# Patient Record
Sex: MALE | Race: WHITE | NOT HISPANIC OR LATINO
[De-identification: names, ages, dates, MRNs, and addresses within clinical notes are randomized per-mention and may not be internally consistent; named-entity substitution may affect disease eponyms.]

---

## 2022-04-27 PROBLEM — Z00.00 ENCOUNTER FOR PREVENTIVE HEALTH EXAMINATION: Status: ACTIVE | Noted: 2022-04-27

## 2022-06-10 ENCOUNTER — APPOINTMENT (OUTPATIENT)
Dept: ORTHOPEDIC SURGERY | Facility: CLINIC | Age: 72
End: 2022-06-10
Payer: MEDICARE

## 2022-06-10 VITALS — WEIGHT: 218 LBS | BODY MASS INDEX: 31.21 KG/M2 | HEIGHT: 70 IN

## 2022-06-10 DIAGNOSIS — Z80.0 FAMILY HISTORY OF MALIGNANT NEOPLASM OF DIGESTIVE ORGANS: ICD-10-CM

## 2022-06-10 DIAGNOSIS — Z78.9 OTHER SPECIFIED HEALTH STATUS: ICD-10-CM

## 2022-06-10 DIAGNOSIS — Z86.79 PERSONAL HISTORY OF OTHER DISEASES OF THE CIRCULATORY SYSTEM: ICD-10-CM

## 2022-06-10 PROCEDURE — 99214 OFFICE O/P EST MOD 30 MIN: CPT | Mod: 25

## 2022-06-10 PROCEDURE — 20610 DRAIN/INJ JOINT/BURSA W/O US: CPT | Mod: 50

## 2022-06-10 NOTE — PROCEDURE
[Large Joint Injection] : Large joint injection [Bilateral] : bilaterally of the [Knee] : knee [Orthovisc] : Orthovisc [#1] : series #1

## 2022-06-13 NOTE — HISTORY OF PRESENT ILLNESS
[7] : 7 [4] : 4 [Dull/Aching] : dull/aching [Sharp] : sharp [de-identified] :  \par Reason for Visit 6. Injection \par Patient Complaint - 63 y/o male c/o left knee pain for past few months without injury. Pain with arising out of a chair. Can tolerate level walking. No buckling or locking.\par 5/20/15: Pt is here for L knee f/u. He is s/p Orthovisc series in November 2014. He will begin repeat series today with #1 L knee.\par 5/28/15: He is here for the second left knee Orthovisc injection. He reports no problems after the first injection.\par 11/4/15: Pt is here for Orthovisc injection #3 for left knee.\par 12/7/15: Pt is here for orthovisc injection # 4 left knee. He reports improvement in pain.\par \par 3/22/16: NI: right knee. he reports worsening right knee pain, he has obtained excellent relief in the past from orthovisc injections for the right knee and a request to start a new series of injections for the RIGHT knee will be made today.\par \par 4/6/16: Pt presents today to start a new series of Orthovisc injections for the right knee. \par \par 4/18/16: Pt presents for Orthovisc injection #2 for the right knee. He states that he has felt some slight improvement since the first injection.\par \par 6/15/16: Pt presents for Orthovisc injection #3 for the right knee. \par \par 7/15/16: Pt presents for final Orthovisc injections #4 for the right knee. \par \par 7/20/16: PT present for f/u back and left shoulder. \par He reports a recent exacerbation of lower back pain\par He reports left shoulder pain which is present when reaching upwards and causing nocturnal awakening - *SA injection left shoulder. \par \par He reports that right knee Orthovisc injections provided good relief. \par He will return next week to start a series of orthovisc injections for the left knee\par \par 9/20/16: Pt presents for f/u. He reports that he obtained excellent relief from a series of Orthovisc injections in the past and has requested to start a new series today. Pt presents to start a new series of Orthovisc injections for the left knee. \par \par _____\par \par 7/2/18: Mr. Ugo Diallo, a 68-year-old male, presents today for b/l knees. \par He reports that he has been experiencing sharp pain over the posterior aspect of the left knee. He reports that he has experiencing zhanna giving way of the left knee. \par He reports that he obtained significant relief from orthovisc injections two years ago and is interested in starting a new series. \par *New series of Orthovisc injections for b/l knees started today.\par \par 7/19/18: Pt presents for Orthovisc injections #2 for b/l knees.\par \par 8/2/18: Pt presents for Orthovisc injections #3 for b/l knees.\par \par 8/8/18: Pt presents for final Orthovisc injections #4 for b/l knees.\par \par 4/29/19: Pt presents for f/u b/l knees. He reports that he obtained significant relief after completing the series of Orthovisc injections in August. He reports that recently he has been seeing a return and worsening of his b/l knee pain. He states that his pain is most present with climbing stairs and extended walking. \par *New series Orthovisc injections for b/l knees started today.\par \par 7/1/19: Pt presents for Orthovisc injections #2 for b/l knees.\par \par 8/7/19: Pt presents for Orthovisc injections #3 for b/l knees.\par \par 8/29/19: Pt presents for Orthovisc injections #4 for b/l knees.\par \par 6/14/2021: PT presents today for F/UP B/L knee.\par Pt reports that B/L knee Orthovisc injections provided good relief from previous visit.\par **New series Orthovisc injections for b/l knees started today**.\par \par 6/21/2021: Pt presents for Orthovisc injections #2 for b/l knees.\par \par 6/28/2021:Pt presents for Orthovisc injections #3 for b/l knees.\par \par 7/6/2021:Pt presents for Orthovisc injections #4 for b/l knees \par  \par 06/10/2022: B/L knee\par Pt is interested in starting a new series. Pt reports longer than 6 months following the last Orthovisc series. \par *****Orthovisc B/L knee inj #1******* [] : Post Surgical Visit: no [FreeTextEntry1] : both knees  [de-identified] : none

## 2022-06-13 NOTE — PHYSICAL EXAM
[Bilateral] : knee bilaterally [FreeTextEntry3] : \par \par RT knee: moderate effusion. ROM slight flexion contracture to at least 120 degrees. No laxity large MFC ridge Najma's is positive producing posterior pain.\par \par LT Knee: Small effusion. ROM 0-120 degrees. No laxity. Moderate MFC osteophyte ridge. Najma's negative. No joint line tenderness weight bearing is painful. There is an antalgic gait due to pain from both knees.

## 2022-06-24 ENCOUNTER — APPOINTMENT (OUTPATIENT)
Dept: ORTHOPEDIC SURGERY | Facility: CLINIC | Age: 72
End: 2022-06-24
Payer: MEDICARE

## 2022-06-24 VITALS — HEIGHT: 70 IN | WEIGHT: 218 LBS | BODY MASS INDEX: 31.21 KG/M2

## 2022-06-24 PROCEDURE — 20610 DRAIN/INJ JOINT/BURSA W/O US: CPT | Mod: 50

## 2022-06-24 PROCEDURE — 99213 OFFICE O/P EST LOW 20 MIN: CPT | Mod: 25

## 2022-06-24 NOTE — PROCEDURE
[Large Joint Injection] : Large joint injection [Bilateral] : bilaterally of the [Knee] : knee [Orthovisc] : Orthovisc [#2] : series #2

## 2022-06-24 NOTE — HISTORY OF PRESENT ILLNESS
[7] : 7 [4] : 4 [Dull/Aching] : dull/aching [Sharp] : sharp [2] : 2 [Orthovisc] : Orthovisc [de-identified] :  \par Reason for Visit 6. Injection \par Patient Complaint - 63 y/o male c/o left knee pain for past few months without injury. Pain with arising out of a chair. Can tolerate level walking. No buckling or locking.\par 5/20/15: Pt is here for L knee f/u. He is s/p Orthovisc series in November 2014. He will begin repeat series today with #1 L knee.\par 5/28/15: He is here for the second left knee Orthovisc injection. He reports no problems after the first injection.\par 11/4/15: Pt is here for Orthovisc injection #3 for left knee.\par 12/7/15: Pt is here for orthovisc injection # 4 left knee. He reports improvement in pain.\par \par 3/22/16: NI: right knee. he reports worsening right knee pain, he has obtained excellent relief in the past from orthovisc injections for the right knee and a request to start a new series of injections for the RIGHT knee will be made today.\par \par 4/6/16: Pt presents today to start a new series of Orthovisc injections for the right knee. \par \par 4/18/16: Pt presents for Orthovisc injection #2 for the right knee. He states that he has felt some slight improvement since the first injection.\par \par 6/15/16: Pt presents for Orthovisc injection #3 for the right knee. \par \par 7/15/16: Pt presents for final Orthovisc injections #4 for the right knee. \par \par 7/20/16: PT present for f/u back and left shoulder. \par He reports a recent exacerbation of lower back pain\par He reports left shoulder pain which is present when reaching upwards and causing nocturnal awakening - *SA injection left shoulder. \par \par He reports that right knee Orthovisc injections provided good relief. \par He will return next week to start a series of orthovisc injections for the left knee\par \par 9/20/16: Pt presents for f/u. He reports that he obtained excellent relief from a series of Orthovisc injections in the past and has requested to start a new series today. Pt presents to start a new series of Orthovisc injections for the left knee. \par \par _____\par \par 7/2/18: Mr. Ugo Diallo, a 68-year-old male, presents today for b/l knees. \par He reports that he has been experiencing sharp pain over the posterior aspect of the left knee. He reports that he has experiencing zhanna giving way of the left knee. \par He reports that he obtained significant relief from orthovisc injections two years ago and is interested in starting a new series. \par *New series of Orthovisc injections for b/l knees started today.\par \par 7/19/18: Pt presents for Orthovisc injections #2 for b/l knees.\par \par 8/2/18: Pt presents for Orthovisc injections #3 for b/l knees.\par \par 8/8/18: Pt presents for final Orthovisc injections #4 for b/l knees.\par \par 4/29/19: Pt presents for f/u b/l knees. He reports that he obtained significant relief after completing the series of Orthovisc injections in August. He reports that recently he has been seeing a return and worsening of his b/l knee pain. He states that his pain is most present with climbing stairs and extended walking. \par *New series Orthovisc injections for b/l knees started today.\par \par 7/1/19: Pt presents for Orthovisc injections #2 for b/l knees.\par \par 8/7/19: Pt presents for Orthovisc injections #3 for b/l knees.\par \par 8/29/19: Pt presents for Orthovisc injections #4 for b/l knees.\par \par 6/14/2021: PT presents today for F/UP B/L knee.\par Pt reports that B/L knee Orthovisc injections provided good relief from previous visit.\par **New series Orthovisc injections for b/l knees started today**.\par \par 6/21/2021: Pt presents for Orthovisc injections #2 for b/l knees.\par \par 6/28/2021:Pt presents for Orthovisc injections #3 for b/l knees.\par \par 7/6/2021:Pt presents for Orthovisc injections #4 for b/l knees \par  \par 06/10/2022: B/L knee\par Pt is interested in starting a new series. Pt reports longer than 6 months following the last Orthovisc series. \par *****Orthovisc B/L knee inj #1*******\par \par 06/24/2022: B/L knee\par ******Orthovisc B/L inj #2************* [] : Post Surgical Visit: no [FreeTextEntry1] : both knees  [de-identified] : none  [de-identified] : 6/10/2022 [de-identified] : bilateral knee [TWNoteComboBox1] : 80%

## 2022-07-08 ENCOUNTER — APPOINTMENT (OUTPATIENT)
Dept: ORTHOPEDIC SURGERY | Facility: CLINIC | Age: 72
End: 2022-07-08

## 2022-07-08 VITALS — WEIGHT: 218 LBS | HEIGHT: 70 IN | BODY MASS INDEX: 31.21 KG/M2

## 2022-07-08 PROCEDURE — 20610 DRAIN/INJ JOINT/BURSA W/O US: CPT | Mod: 50

## 2022-07-08 PROCEDURE — 99213 OFFICE O/P EST LOW 20 MIN: CPT | Mod: 25

## 2022-07-08 NOTE — PROCEDURE
[Large Joint Injection] : Large joint injection [Bilateral] : bilaterally of the [Knee] : knee [Orthovisc] : Orthovisc [#3] : series #3

## 2022-07-08 NOTE — HISTORY OF PRESENT ILLNESS
[Orthovisc] : Orthovisc [7] : 7 [4] : 4 [Dull/Aching] : dull/aching [Sharp] : sharp [2] : 2 [de-identified] :  \par Reason for Visit 6. Injection \par Patient Complaint - 63 y/o male c/o left knee pain for past few months without injury. Pain with arising out of a chair. Can tolerate level walking. No buckling or locking.\par 5/20/15: Pt is here for L knee f/u. He is s/p Orthovisc series in November 2014. He will begin repeat series today with #1 L knee.\par 5/28/15: He is here for the second left knee Orthovisc injection. He reports no problems after the first injection.\par 11/4/15: Pt is here for Orthovisc injection #3 for left knee.\par 12/7/15: Pt is here for orthovisc injection # 4 left knee. He reports improvement in pain.\par \par 3/22/16: NI: right knee. he reports worsening right knee pain, he has obtained excellent relief in the past from orthovisc injections for the right knee and a request to start a new series of injections for the RIGHT knee will be made today.\par \par 4/6/16: Pt presents today to start a new series of Orthovisc injections for the right knee. \par \par 4/18/16: Pt presents for Orthovisc injection #2 for the right knee. He states that he has felt some slight improvement since the first injection.\par \par 6/15/16: Pt presents for Orthovisc injection #3 for the right knee. \par \par 7/15/16: Pt presents for final Orthovisc injections #4 for the right knee. \par \par 7/20/16: PT present for f/u back and left shoulder. \par He reports a recent exacerbation of lower back pain\par He reports left shoulder pain which is present when reaching upwards and causing nocturnal awakening - *SA injection left shoulder. \par \par He reports that right knee Orthovisc injections provided good relief. \par He will return next week to start a series of orthovisc injections for the left knee\par \par 9/20/16: Pt presents for f/u. He reports that he obtained excellent relief from a series of Orthovisc injections in the past and has requested to start a new series today. Pt presents to start a new series of Orthovisc injections for the left knee. \par \par _____\par \par 7/2/18: Mr. Ugo Diallo, a 68-year-old male, presents today for b/l knees. \par He reports that he has been experiencing sharp pain over the posterior aspect of the left knee. He reports that he has experiencing zhanna giving way of the left knee. \par He reports that he obtained significant relief from orthovisc injections two years ago and is interested in starting a new series. \par *New series of Orthovisc injections for b/l knees started today.\par \par 7/19/18: Pt presents for Orthovisc injections #2 for b/l knees.\par \par 8/2/18: Pt presents for Orthovisc injections #3 for b/l knees.\par \par 8/8/18: Pt presents for final Orthovisc injections #4 for b/l knees.\par \par 4/29/19: Pt presents for f/u b/l knees. He reports that he obtained significant relief after completing the series of Orthovisc injections in August. He reports that recently he has been seeing a return and worsening of his b/l knee pain. He states that his pain is most present with climbing stairs and extended walking. \par *New series Orthovisc injections for b/l knees started today.\par \par 7/1/19: Pt presents for Orthovisc injections #2 for b/l knees.\par \par 8/7/19: Pt presents for Orthovisc injections #3 for b/l knees.\par \par 8/29/19: Pt presents for Orthovisc injections #4 for b/l knees.\par \par 6/14/2021: PT presents today for F/UP B/L knee.\par Pt reports that B/L knee Orthovisc injections provided good relief from previous visit.\par **New series Orthovisc injections for b/l knees started today**.\par \par 6/21/2021: Pt presents for Orthovisc injections #2 for b/l knees.\par \par 6/28/2021:Pt presents for Orthovisc injections #3 for b/l knees.\par \par 7/6/2021:Pt presents for Orthovisc injections #4 for b/l knees \par  \par 06/10/2022: B/L knee\par Pt is interested in starting a new series. Pt reports longer than 6 months following the last Orthovisc series. \par *****Orthovisc B/L knee inj #1*******\par \par 06/24/2022: B/L knee\par ******Orthovisc B/L inj #2*************\par \par 07/08/2022: B/L knee\par ******Orthovisc B/L inj #3************* [] : Post Surgical Visit: no [FreeTextEntry1] : both knees  [de-identified] : none  [de-identified] : 6/10/2022 [de-identified] : bilateral knee [TWNoteComboBox1] : 80%

## 2022-07-08 NOTE — HISTORY OF PRESENT ILLNESS
[Orthovisc] : Orthovisc [7] : 7 [4] : 4 [Dull/Aching] : dull/aching [Sharp] : sharp [2] : 2 [de-identified] :  \par Reason for Visit 6. Injection \par Patient Complaint - 65 y/o male c/o left knee pain for past few months without injury. Pain with arising out of a chair. Can tolerate level walking. No buckling or locking.\par 5/20/15: Pt is here for L knee f/u. He is s/p Orthovisc series in November 2014. He will begin repeat series today with #1 L knee.\par 5/28/15: He is here for the second left knee Orthovisc injection. He reports no problems after the first injection.\par 11/4/15: Pt is here for Orthovisc injection #3 for left knee.\par 12/7/15: Pt is here for orthovisc injection # 4 left knee. He reports improvement in pain.\par \par 3/22/16: NI: right knee. he reports worsening right knee pain, he has obtained excellent relief in the past from orthovisc injections for the right knee and a request to start a new series of injections for the RIGHT knee will be made today.\par \par 4/6/16: Pt presents today to start a new series of Orthovisc injections for the right knee. \par \par 4/18/16: Pt presents for Orthovisc injection #2 for the right knee. He states that he has felt some slight improvement since the first injection.\par \par 6/15/16: Pt presents for Orthovisc injection #3 for the right knee. \par \par 7/15/16: Pt presents for final Orthovisc injections #4 for the right knee. \par \par 7/20/16: PT present for f/u back and left shoulder. \par He reports a recent exacerbation of lower back pain\par He reports left shoulder pain which is present when reaching upwards and causing nocturnal awakening - *SA injection left shoulder. \par \par He reports that right knee Orthovisc injections provided good relief. \par He will return next week to start a series of orthovisc injections for the left knee\par \par 9/20/16: Pt presents for f/u. He reports that he obtained excellent relief from a series of Orthovisc injections in the past and has requested to start a new series today. Pt presents to start a new series of Orthovisc injections for the left knee. \par \par _____\par \par 7/2/18: Mr. Ugo Diallo, a 68-year-old male, presents today for b/l knees. \par He reports that he has been experiencing sharp pain over the posterior aspect of the left knee. He reports that he has experiencing zhanna giving way of the left knee. \par He reports that he obtained significant relief from orthovisc injections two years ago and is interested in starting a new series. \par *New series of Orthovisc injections for b/l knees started today.\par \par 7/19/18: Pt presents for Orthovisc injections #2 for b/l knees.\par \par 8/2/18: Pt presents for Orthovisc injections #3 for b/l knees.\par \par 8/8/18: Pt presents for final Orthovisc injections #4 for b/l knees.\par \par 4/29/19: Pt presents for f/u b/l knees. He reports that he obtained significant relief after completing the series of Orthovisc injections in August. He reports that recently he has been seeing a return and worsening of his b/l knee pain. He states that his pain is most present with climbing stairs and extended walking. \par *New series Orthovisc injections for b/l knees started today.\par \par 7/1/19: Pt presents for Orthovisc injections #2 for b/l knees.\par \par 8/7/19: Pt presents for Orthovisc injections #3 for b/l knees.\par \par 8/29/19: Pt presents for Orthovisc injections #4 for b/l knees.\par \par 6/14/2021: PT presents today for F/UP B/L knee.\par Pt reports that B/L knee Orthovisc injections provided good relief from previous visit.\par **New series Orthovisc injections for b/l knees started today**.\par \par 6/21/2021: Pt presents for Orthovisc injections #2 for b/l knees.\par \par 6/28/2021:Pt presents for Orthovisc injections #3 for b/l knees.\par \par 7/6/2021:Pt presents for Orthovisc injections #4 for b/l knees \par  \par 06/10/2022: B/L knee\par Pt is interested in starting a new series. Pt reports longer than 6 months following the last Orthovisc series. \par *****Orthovisc B/L knee inj #1*******\par \par 06/24/2022: B/L knee\par ******Orthovisc B/L inj #2*************\par \par 07/08/2022: B/L knee\par ******Orthovisc B/L inj #3************* [] : Post Surgical Visit: no [FreeTextEntry1] : both knees  [de-identified] : none  [de-identified] : 6/10/2022 [de-identified] : bilateral knee [TWNoteComboBox1] : 80%

## 2022-07-29 ENCOUNTER — APPOINTMENT (OUTPATIENT)
Dept: ORTHOPEDIC SURGERY | Facility: CLINIC | Age: 72
End: 2022-07-29

## 2022-07-29 VITALS — HEIGHT: 70 IN | WEIGHT: 212 LBS | BODY MASS INDEX: 30.35 KG/M2

## 2022-07-29 PROCEDURE — 99213 OFFICE O/P EST LOW 20 MIN: CPT | Mod: 25

## 2022-07-29 PROCEDURE — 20610 DRAIN/INJ JOINT/BURSA W/O US: CPT | Mod: 50

## 2022-07-29 NOTE — PROCEDURE
[Large Joint Injection] : Large joint injection [Bilateral] : bilaterally of the [Knee] : knee [Orthovisc] : Orthovisc [#4] : series #4

## 2022-07-29 NOTE — HISTORY OF PRESENT ILLNESS
[Occasional] : occasional [Retired] : Work status: retired [Orthovisc] : Orthovisc [7] : 7 [4] : 4 [Dull/Aching] : dull/aching [Sharp] : sharp [2] : 2 [de-identified] :  \par Reason for Visit 6. Injection \par Patient Complaint - 65 y/o male c/o left knee pain for past few months without injury. Pain with arising out of a chair. Can tolerate level walking. No buckling or locking.\par 5/20/15: Pt is here for L knee f/u. He is s/p Orthovisc series in November 2014. He will begin repeat series today with #1 L knee.\par 5/28/15: He is here for the second left knee Orthovisc injection. He reports no problems after the first injection.\par 11/4/15: Pt is here for Orthovisc injection #3 for left knee.\par 12/7/15: Pt is here for orthovisc injection # 4 left knee. He reports improvement in pain.\par \par 3/22/16: NI: right knee. he reports worsening right knee pain, he has obtained excellent relief in the past from orthovisc injections for the right knee and a request to start a new series of injections for the RIGHT knee will be made today.\par \par 4/6/16: Pt presents today to start a new series of Orthovisc injections for the right knee. \par \par 4/18/16: Pt presents for Orthovisc injection #2 for the right knee. He states that he has felt some slight improvement since the first injection.\par \par 6/15/16: Pt presents for Orthovisc injection #3 for the right knee. \par \par 7/15/16: Pt presents for final Orthovisc injections #4 for the right knee. \par \par 7/20/16: PT present for f/u back and left shoulder. \par He reports a recent exacerbation of lower back pain\par He reports left shoulder pain which is present when reaching upwards and causing nocturnal awakening - *SA injection left shoulder. \par \par He reports that right knee Orthovisc injections provided good relief. \par He will return next week to start a series of orthovisc injections for the left knee\par \par 9/20/16: Pt presents for f/u. He reports that he obtained excellent relief from a series of Orthovisc injections in the past and has requested to start a new series today. Pt presents to start a new series of Orthovisc injections for the left knee. \par \par _____\par \par 7/2/18: Mr. Ugo Diallo, a 68-year-old male, presents today for b/l knees. \par He reports that he has been experiencing sharp pain over the posterior aspect of the left knee. He reports that he has experiencing zhanna giving way of the left knee. \par He reports that he obtained significant relief from orthovisc injections two years ago and is interested in starting a new series. \par *New series of Orthovisc injections for b/l knees started today.\par \par 7/19/18: Pt presents for Orthovisc injections #2 for b/l knees.\par \par 8/2/18: Pt presents for Orthovisc injections #3 for b/l knees.\par \par 8/8/18: Pt presents for final Orthovisc injections #4 for b/l knees.\par \par 4/29/19: Pt presents for f/u b/l knees. He reports that he obtained significant relief after completing the series of Orthovisc injections in August. He reports that recently he has been seeing a return and worsening of his b/l knee pain. He states that his pain is most present with climbing stairs and extended walking. \par *New series Orthovisc injections for b/l knees started today.\par \par 7/1/19: Pt presents for Orthovisc injections #2 for b/l knees.\par \par 8/7/19: Pt presents for Orthovisc injections #3 for b/l knees.\par \par 8/29/19: Pt presents for Orthovisc injections #4 for b/l knees.\par \par 6/14/2021: PT presents today for F/UP B/L knee.\par Pt reports that B/L knee Orthovisc injections provided good relief from previous visit.\par **New series Orthovisc injections for b/l knees started today**.\par \par 6/21/2021: Pt presents for Orthovisc injections #2 for b/l knees.\par \par 6/28/2021:Pt presents for Orthovisc injections #3 for b/l knees.\par \par 7/6/2021:Pt presents for Orthovisc injections #4 for b/l knees \par  \par 06/10/2022: B/L knee\par Pt is interested in starting a new series. Pt reports longer than 6 months following the last Orthovisc series. \par *****Orthovisc B/L knee inj #1*******\par \par 06/24/2022: B/L knee\par ******Orthovisc B/L inj #2*************\par \par 07/08/2022: B/L knee\par ******Orthovisc B/L inj #3*************\par \par 07/29/2022: B/L knee\par ******Orthovisc B/L inj #3*************\par  [] : Post Surgical Visit: no [FreeTextEntry1] : both knees  [de-identified] : none  [de-identified] : 6/10/2022 [de-identified] : bilateral knee [TWNoteComboBox1] : 80%

## 2023-01-27 ENCOUNTER — APPOINTMENT (OUTPATIENT)
Dept: ORTHOPEDIC SURGERY | Facility: CLINIC | Age: 73
End: 2023-01-27

## 2023-06-01 ENCOUNTER — APPOINTMENT (OUTPATIENT)
Dept: ORTHOPEDIC SURGERY | Facility: CLINIC | Age: 73
End: 2023-06-01
Payer: MEDICARE

## 2023-06-01 VITALS — BODY MASS INDEX: 30.35 KG/M2 | HEIGHT: 70 IN | WEIGHT: 212 LBS

## 2023-06-01 PROCEDURE — 99214 OFFICE O/P EST MOD 30 MIN: CPT | Mod: 25

## 2023-06-01 PROCEDURE — 73562 X-RAY EXAM OF KNEE 3: CPT | Mod: 50

## 2023-06-01 PROCEDURE — 20610 DRAIN/INJ JOINT/BURSA W/O US: CPT | Mod: NC

## 2023-06-01 NOTE — PROCEDURE
[FreeTextEntry3] : Large joint injection was performed of the SAVITA knees. The indication for this procedure was pain, inflammation and x-ray evidence of Osteoarthritis on this or prior visit. \par The site was prepped with alcohol, betadine, ethyl chloride sprayed topically and sterile technique used. \par \par An injection of Orthovisc (30mg), series # 1 was used. \par \par Patient tolerated procedure well. Patient was advised to call if redness, pain or fever occur and apply ice for 15 minutes out of every hour for the next 12-24 hours as tolerated. \par \par Patient has tried OTC's including aspirin, Ibuprofen, Aleve, etc or prescription NSAIDS, and/or exercises at home and/or physical therapy without satisfactory response, patient had decreased mobility in the joint and the risks benefits, and alternatives have been discussed, and verbal consent was obtained.

## 2023-06-01 NOTE — DISCUSSION/SUMMARY
[de-identified] : 1) I discussed the risks, benefits and alternatives of treatment options for the SAVITA knees, including activity modification, rest, home exercise, oral antiinflammatory medication, CSI, viscosupplementation.\par 2) The patient may take OTC NSAIDs PRN for pain. \par 3) Pt will continue with activity modification and home exercise as tolerated.  The patient should avoid exercise and activity that aggravates pain. 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 \par \par \par The patient will continue with conservative treatment as described above, and will F/U in 1 week.\par \par \par The patient was advised of the diagnosis.  The natural history of the pathology was explained in full to the patient in layman's terms, including but not limited to the risks, symptoms and available options for treatment.  We discussed the risks, benefits and alternatives of the treatment options and the advice I provided to the patient as listed above.  Pt was given the opportunity to ask questions, and all questions were answered.  The discussion was not limited to the above.\par \par Entered by Angela Inman acting as scribe.

## 2023-06-01 NOTE — IMAGING
[de-identified] : Right Knee Exam: Minimal effusion. ROM is from full extension to at least 120 degrees. There is a moderate valgus which is fully correctable. Anterior drawer test is negative. He is able to walk with an antalgic limp. There is no joint line tenderness. \par \par Left Knee Exam: There is mild valgus which is fully correctable. Otherwise ligamental stable. Anterior drawer test is negative. There is a 2 degree flexion contracture to 120 degrees. The extension endpoint is painful. With force applied against the extension limit there is anterior pain. Weightbearing Najma's produces posterolateral pain. There is a small effusion. No joint line tenderness.  \par \par Right Knee X-Rays: Advanced articular cartilage loss of the lateral compartment with reactive spurs and valgus angulation. \par \par Left Knee X-Rays: Advanced articular cartilage obliteration of the lateral compartment without reactive spurs. Valgus angulation.

## 2023-06-01 NOTE — HISTORY OF PRESENT ILLNESS
[8] : 8 [5] : 5 [Dull/Aching] : dull/aching [Sharp] : sharp [Occasional] : occasional [Sitting] : sitting [Standing] : standing [Walking] : walking [Retired] : Work status: retired [2] : 2 [Orthovisc] : Orthovisc [de-identified] :  \par Reason for Visit 6. Injection \par Patient Complaint - 65 y/o male c/o left knee pain for past few months without injury. Pain with arising out of a chair. Can tolerate level walking. No buckling or locking.\par 5/20/15: Pt is here for L knee f/u. He is s/p Orthovisc series in November 2014. He will begin repeat series today with #1 L knee.\par 5/28/15: He is here for the second left knee Orthovisc injection. He reports no problems after the first injection.\par 11/4/15: Pt is here for Orthovisc injection #3 for left knee.\par 12/7/15: Pt is here for orthovisc injection # 4 left knee. He reports improvement in pain.\par \par 3/22/16: NI: right knee. he reports worsening right knee pain, he has obtained excellent relief in the past from orthovisc injections for the right knee and a request to start a new series of injections for the RIGHT knee will be made today.\par \par 4/6/16: Pt presents today to start a new series of Orthovisc injections for the right knee. \par \par 4/18/16: Pt presents for Orthovisc injection #2 for the right knee. He states that he has felt some slight improvement since the first injection.\par \par 6/15/16: Pt presents for Orthovisc injection #3 for the right knee. \par \par 7/15/16: Pt presents for final Orthovisc injections #4 for the right knee. \par \par 7/20/16: PT present for f/u back and left shoulder. \par He reports a recent exacerbation of lower back pain\par He reports left shoulder pain which is present when reaching upwards and causing nocturnal awakening - *SA injection left shoulder. \par \par He reports that right knee Orthovisc injections provided good relief. \par He will return next week to start a series of orthovisc injections for the left knee\par \par 9/20/16: Pt presents for f/u. He reports that he obtained excellent relief from a series of Orthovisc injections in the past and has requested to start a new series today. Pt presents to start a new series of Orthovisc injections for the left knee. \par \par _____\par \par 7/2/18: Mr. Ugo Diallo, a 68-year-old male, presents today for b/l knees. \par He reports that he has been experiencing sharp pain over the posterior aspect of the left knee. He reports that he has experiencing zhanna giving way of the left knee. \par He reports that he obtained significant relief from orthovisc injections two years ago and is interested in starting a new series. \par *New series of Orthovisc injections for b/l knees started today.\par \par 7/19/18: Pt presents for Orthovisc injections #2 for b/l knees.\par \par 8/2/18: Pt presents for Orthovisc injections #3 for b/l knees.\par \par 8/8/18: Pt presents for final Orthovisc injections #4 for b/l knees.\par \par 4/29/19: Pt presents for f/u b/l knees. He reports that he obtained significant relief after completing the series of Orthovisc injections in August. He reports that recently he has been seeing a return and worsening of his b/l knee pain. He states that his pain is most present with climbing stairs and extended walking. \par *New series Orthovisc injections for b/l knees started today.\par \par 7/1/19: Pt presents for Orthovisc injections #2 for b/l knees.\par \par 8/7/19: Pt presents for Orthovisc injections #3 for b/l knees.\par \par 8/29/19: Pt presents for Orthovisc injections #4 for b/l knees.\par \par 6/14/2021: PT presents today for F/UP B/L knee.\par Pt reports that B/L knee Orthovisc injections provided good relief from previous visit.\par **New series Orthovisc injections for b/l knees started today**.\par \par 6/21/2021: Pt presents for Orthovisc injections #2 for b/l knees.\par \par 6/28/2021:Pt presents for Orthovisc injections #3 for b/l knees.\par \par 7/6/2021:Pt presents for Orthovisc injections #4 for b/l knees \par  \par 06/10/2022: B/L knee\par Pt is interested in starting a new series. Pt reports longer than 6 months following the last Orthovisc series. \par *****Orthovisc B/L knee inj #1*******\par \par 06/24/2022: B/L knee\par ******Orthovisc B/L inj #2*************\par \par 07/08/2022: B/L knee\par ******Orthovisc B/L inj #3*************\par \par 07/29/2022: B/L knee\par ******Orthovisc B/L inj #3*************\par \par 06/01/2023: B/L Knees\par Pt reports experiencing a stabbing pain in the posterior of SAVITA knees, especially when pivoting. He also struggles to rise from a sitting position due to the pain. He also notes sharp pain when changing position in bed.\par *****Orthovisc B/L knee inj #1******* [] : Post Surgical Visit: no [FreeTextEntry1] : both knees  [FreeTextEntry5] : pt states he is here for a follow of both knees and is experiencing pain behind his right knee [de-identified] : none  [de-identified] : 6/10/2022 [de-identified] : bilateral knee [TWNoteComboBox1] : 80%

## 2023-06-01 NOTE — ASSESSMENT
[FreeTextEntry1] : The history is consistent with arthritis and pain also of meniscal origin. Since he is not a candidate for meniscal surgery due to advance arthritis no further imaging is required.

## 2023-06-09 ENCOUNTER — APPOINTMENT (OUTPATIENT)
Dept: ORTHOPEDIC SURGERY | Facility: CLINIC | Age: 73
End: 2023-06-09
Payer: MEDICARE

## 2023-06-09 PROCEDURE — 20610 DRAIN/INJ JOINT/BURSA W/O US: CPT | Mod: 50

## 2023-06-09 PROCEDURE — 99212 OFFICE O/P EST SF 10 MIN: CPT | Mod: 25

## 2023-06-13 NOTE — HISTORY OF PRESENT ILLNESS
[de-identified] :  \par Reason for Visit 6. Injection \par Patient Complaint - 63 y/o male c/o left knee pain for past few months without injury. Pain with arising out of a chair. Can tolerate level walking. No buckling or locking.\par 5/20/15: Pt is here for L knee f/u. He is s/p Orthovisc series in November 2014. He will begin repeat series today with #1 L knee.\par 5/28/15: He is here for the second left knee Orthovisc injection. He reports no problems after the first injection.\par 11/4/15: Pt is here for Orthovisc injection #3 for left knee.\par 12/7/15: Pt is here for orthovisc injection # 4 left knee. He reports improvement in pain.\par \par 3/22/16: NI: right knee. he reports worsening right knee pain, he has obtained excellent relief in the past from orthovisc injections for the right knee and a request to start a new series of injections for the RIGHT knee will be made today.\par \par 4/6/16: Pt presents today to start a new series of Orthovisc injections for the right knee. \par \par 4/18/16: Pt presents for Orthovisc injection #2 for the right knee. He states that he has felt some slight improvement since the first injection.\par \par 6/15/16: Pt presents for Orthovisc injection #3 for the right knee. \par \par 7/15/16: Pt presents for final Orthovisc injections #4 for the right knee. \par \par 7/20/16: PT present for f/u back and left shoulder. \par He reports a recent exacerbation of lower back pain\par He reports left shoulder pain which is present when reaching upwards and causing nocturnal awakening - *SA injection left shoulder. \par \par He reports that right knee Orthovisc injections provided good relief. \par He will return next week to start a series of orthovisc injections for the left knee\par \par 9/20/16: Pt presents for f/u. He reports that he obtained excellent relief from a series of Orthovisc injections in the past and has requested to start a new series today. Pt presents to start a new series of Orthovisc injections for the left knee. \par \par _____\par \par 7/2/18: Mr. Ugo Diallo, a 68-year-old male, presents today for b/l knees. \par He reports that he has been experiencing sharp pain over the posterior aspect of the left knee. He reports that he has experiencing zhanna giving way of the left knee. \par He reports that he obtained significant relief from orthovisc injections two years ago and is interested in starting a new series. \par *New series of Orthovisc injections for b/l knees started today.\par \par 7/19/18: Pt presents for Orthovisc injections #2 for b/l knees.\par \par 8/2/18: Pt presents for Orthovisc injections #3 for b/l knees.\par \par 8/8/18: Pt presents for final Orthovisc injections #4 for b/l knees.\par \par 4/29/19: Pt presents for f/u b/l knees. He reports that he obtained significant relief after completing the series of Orthovisc injections in August. He reports that recently he has been seeing a return and worsening of his b/l knee pain. He states that his pain is most present with climbing stairs and extended walking. \par *New series Orthovisc injections for b/l knees started today.\par \par 7/1/19: Pt presents for Orthovisc injections #2 for b/l knees.\par \par 8/7/19: Pt presents for Orthovisc injections #3 for b/l knees.\par \par 8/29/19: Pt presents for Orthovisc injections #4 for b/l knees.\par \par 6/14/2021: PT presents today for F/UP B/L knee.\par Pt reports that B/L knee Orthovisc injections provided good relief from previous visit.\par **New series Orthovisc injections for b/l knees started today**.\par \par 6/21/2021: Pt presents for Orthovisc injections #2 for b/l knees.\par \par 6/28/2021:Pt presents for Orthovisc injections #3 for b/l knees.\par \par 7/6/2021:Pt presents for Orthovisc injections #4 for b/l knees \par  \par 06/10/2022: B/L knee\par Pt is interested in starting a new series. Pt reports longer than 6 months following the last Orthovisc series. \par *****Orthovisc B/L knee inj #1*******\par \par 06/24/2022: B/L knee\par ******Orthovisc B/L inj #2*************\par \par 07/08/2022: B/L knee\par ******Orthovisc B/L inj #3*************\par \par 07/29/2022: B/L knee\par ******Orthovisc B/L inj #3*************\par \par 06/01/2023: B/L Knees\par Pt reports experiencing a stabbing pain in the posterior of SAVITA knees, especially when pivoting. He also struggles to rise from a sitting position due to the pain. He also notes sharp pain when changing position in bed.\par *****Orthovisc B/L knee inj #1*******\par \par 6/12/23: Pt presents for Orthovisc #2 for b/l knees.

## 2023-06-13 NOTE — PROCEDURE
[FreeTextEntry3] : Large joint injection was performed of the SAVITA knees. The indication for this procedure was pain, inflammation and x-ray evidence of Osteoarthritis on this or prior visit. \par The site was prepped with alcohol, betadine, ethyl chloride sprayed topically and sterile technique used. \par \par An injection of Orthovisc (30mg), series # 2 was used. \par \par Patient tolerated procedure well. Patient was advised to call if redness, pain or fever occur and apply ice for 15 minutes out of every hour for the next 12-24 hours as tolerated. \par \par Patient has tried OTC's including aspirin, Ibuprofen, Aleve, etc or prescription NSAIDS, and/or exercises at home and/or physical therapy without satisfactory response, patient had decreased mobility in the joint and the risks benefits, and alternatives have been discussed, and verbal consent was obtained.

## 2023-06-13 NOTE — DISCUSSION/SUMMARY
[de-identified] : 1) I discussed the risks, benefits and alternatives of treatment options for the SAVITA knees, including activity modification, rest, home exercise, oral antiinflammatory medication, CSI, viscosupplementation.\par 2) The patient may take OTC NSAIDs PRN for pain. \par 3) Pt will continue with activity modification and home exercise as tolerated.  The patient should avoid exercise and activity that aggravates pain. \par \par Orthovisc injections #2 performed to SAVITA knees. \par \par \par The patient will continue with conservative treatment as described above, and will F/U in 1 week.\par \par \par The patient was advised of the diagnosis.  The natural history of the pathology was explained in full to the patient in layman's terms, including but not limited to the risks, symptoms and available options for treatment.  We discussed the risks, benefits and alternatives of the treatment options and the advice I provided to the patient as listed above.  Pt was given the opportunity to ask questions, and all questions were answered.  The discussion was not limited to the above.\par \par Entered by Tiesha Parrish acting as scribe.

## 2023-06-13 NOTE — DISCUSSION/SUMMARY
[de-identified] : 1) I discussed the risks, benefits and alternatives of treatment options for the SAVITA knees, including activity modification, rest, home exercise, oral antiinflammatory medication, CSI, viscosupplementation.\par 2) The patient may take OTC NSAIDs PRN for pain. \par 3) Pt will continue with activity modification and home exercise as tolerated.  The patient should avoid exercise and activity that aggravates pain. \par \par Orthovisc injections #2 performed to SAVITA knees. \par \par \par The patient will continue with conservative treatment as described above, and will F/U in 1 week.\par \par \par The patient was advised of the diagnosis.  The natural history of the pathology was explained in full to the patient in layman's terms, including but not limited to the risks, symptoms and available options for treatment.  We discussed the risks, benefits and alternatives of the treatment options and the advice I provided to the patient as listed above.  Pt was given the opportunity to ask questions, and all questions were answered.  The discussion was not limited to the above.\par \par Entered by Tiesha Parrish acting as scribe.

## 2023-06-13 NOTE — HISTORY OF PRESENT ILLNESS
[de-identified] :  \par Reason for Visit 6. Injection \par Patient Complaint - 65 y/o male c/o left knee pain for past few months without injury. Pain with arising out of a chair. Can tolerate level walking. No buckling or locking.\par 5/20/15: Pt is here for L knee f/u. He is s/p Orthovisc series in November 2014. He will begin repeat series today with #1 L knee.\par 5/28/15: He is here for the second left knee Orthovisc injection. He reports no problems after the first injection.\par 11/4/15: Pt is here for Orthovisc injection #3 for left knee.\par 12/7/15: Pt is here for orthovisc injection # 4 left knee. He reports improvement in pain.\par \par 3/22/16: NI: right knee. he reports worsening right knee pain, he has obtained excellent relief in the past from orthovisc injections for the right knee and a request to start a new series of injections for the RIGHT knee will be made today.\par \par 4/6/16: Pt presents today to start a new series of Orthovisc injections for the right knee. \par \par 4/18/16: Pt presents for Orthovisc injection #2 for the right knee. He states that he has felt some slight improvement since the first injection.\par \par 6/15/16: Pt presents for Orthovisc injection #3 for the right knee. \par \par 7/15/16: Pt presents for final Orthovisc injections #4 for the right knee. \par \par 7/20/16: PT present for f/u back and left shoulder. \par He reports a recent exacerbation of lower back pain\par He reports left shoulder pain which is present when reaching upwards and causing nocturnal awakening - *SA injection left shoulder. \par \par He reports that right knee Orthovisc injections provided good relief. \par He will return next week to start a series of orthovisc injections for the left knee\par \par 9/20/16: Pt presents for f/u. He reports that he obtained excellent relief from a series of Orthovisc injections in the past and has requested to start a new series today. Pt presents to start a new series of Orthovisc injections for the left knee. \par \par _____\par \par 7/2/18: Mr. Ugo Diallo, a 68-year-old male, presents today for b/l knees. \par He reports that he has been experiencing sharp pain over the posterior aspect of the left knee. He reports that he has experiencing zhanna giving way of the left knee. \par He reports that he obtained significant relief from orthovisc injections two years ago and is interested in starting a new series. \par *New series of Orthovisc injections for b/l knees started today.\par \par 7/19/18: Pt presents for Orthovisc injections #2 for b/l knees.\par \par 8/2/18: Pt presents for Orthovisc injections #3 for b/l knees.\par \par 8/8/18: Pt presents for final Orthovisc injections #4 for b/l knees.\par \par 4/29/19: Pt presents for f/u b/l knees. He reports that he obtained significant relief after completing the series of Orthovisc injections in August. He reports that recently he has been seeing a return and worsening of his b/l knee pain. He states that his pain is most present with climbing stairs and extended walking. \par *New series Orthovisc injections for b/l knees started today.\par \par 7/1/19: Pt presents for Orthovisc injections #2 for b/l knees.\par \par 8/7/19: Pt presents for Orthovisc injections #3 for b/l knees.\par \par 8/29/19: Pt presents for Orthovisc injections #4 for b/l knees.\par \par 6/14/2021: PT presents today for F/UP B/L knee.\par Pt reports that B/L knee Orthovisc injections provided good relief from previous visit.\par **New series Orthovisc injections for b/l knees started today**.\par \par 6/21/2021: Pt presents for Orthovisc injections #2 for b/l knees.\par \par 6/28/2021:Pt presents for Orthovisc injections #3 for b/l knees.\par \par 7/6/2021:Pt presents for Orthovisc injections #4 for b/l knees \par  \par 06/10/2022: B/L knee\par Pt is interested in starting a new series. Pt reports longer than 6 months following the last Orthovisc series. \par *****Orthovisc B/L knee inj #1*******\par \par 06/24/2022: B/L knee\par ******Orthovisc B/L inj #2*************\par \par 07/08/2022: B/L knee\par ******Orthovisc B/L inj #3*************\par \par 07/29/2022: B/L knee\par ******Orthovisc B/L inj #3*************\par \par 06/01/2023: B/L Knees\par Pt reports experiencing a stabbing pain in the posterior of SAVITA knees, especially when pivoting. He also struggles to rise from a sitting position due to the pain. He also notes sharp pain when changing position in bed.\par *****Orthovisc B/L knee inj #1*******\par \par 6/12/23: Pt presents for Orthovisc #2 for b/l knees.

## 2023-06-15 ENCOUNTER — APPOINTMENT (OUTPATIENT)
Dept: ORTHOPEDIC SURGERY | Facility: CLINIC | Age: 73
End: 2023-06-15
Payer: MEDICARE

## 2023-06-15 PROCEDURE — 99213 OFFICE O/P EST LOW 20 MIN: CPT | Mod: 25

## 2023-06-15 PROCEDURE — 20610 DRAIN/INJ JOINT/BURSA W/O US: CPT | Mod: RT

## 2023-06-15 NOTE — IMAGING
[de-identified] : Right Knee Exam: Minimal effusion. ROM is from full extension to at least 120 degrees. There is a moderate valgus which is fully correctable. Anterior drawer test is negative. He is able to walk with an antalgic limp. There is no joint line tenderness. \par \par Left Knee Exam: There is mild valgus which is fully correctable. Otherwise ligamental stable. Anterior drawer test is negative. There is a 2 degree flexion contracture to 120 degrees. The extension endpoint is painful. With force applied against the extension limit there is anterior pain. Weightbearing Najma's produces posterolateral pain. There is a small effusion. No joint line tenderness.  \par \par Right Knee X-Rays: Advanced articular cartilage loss of the lateral compartment with reactive spurs and valgus angulation. \par \par Left Knee X-Rays: Advanced articular cartilage obliteration of the lateral compartment without reactive spurs. Valgus angulation.

## 2023-06-15 NOTE — DISCUSSION/SUMMARY
[de-identified] : 1) I discussed the risks, benefits and alternatives of treatment options for the SAVITA knees, including activity modification, rest, home exercise, oral antiinflammatory medication, CSI, viscosupplementation.\par 2) The patient may take OTC NSAIDs PRN for pain. \par 3) Pt will continue with activity modification and home exercise as tolerated.  The patient should avoid exercise and activity that aggravates pain. \par \par Orthovisc injections #3 performed to SAVITA knees. \par \par \par The patient will continue with conservative treatment as described above, and will F/U in 1 week.\par \par \par The patient was advised of the diagnosis.  The natural history of the pathology was explained in full to the patient in layman's terms, including but not limited to the risks, symptoms and available options for treatment.  We discussed the risks, benefits and alternatives of the treatment options and the advice I provided to the patient as listed above.  Pt was given the opportunity to ask questions, and all questions were answered.  The discussion was not limited to the above.\par \par Entered by Angela Inman acting as scribe.

## 2023-06-15 NOTE — HISTORY OF PRESENT ILLNESS
[de-identified] :  \par Reason for Visit 6. Injection \par Patient Complaint - 65 y/o male c/o left knee pain for past few months without injury. Pain with arising out of a chair. Can tolerate level walking. No buckling or locking.\par 5/20/15: Pt is here for L knee f/u. He is s/p Orthovisc series in November 2014. He will begin repeat series today with #1 L knee.\par 5/28/15: He is here for the second left knee Orthovisc injection. He reports no problems after the first injection.\par 11/4/15: Pt is here for Orthovisc injection #3 for left knee.\par 12/7/15: Pt is here for orthovisc injection # 4 left knee. He reports improvement in pain.\par \par 3/22/16: NI: right knee. he reports worsening right knee pain, he has obtained excellent relief in the past from orthovisc injections for the right knee and a request to start a new series of injections for the RIGHT knee will be made today.\par \par 4/6/16: Pt presents today to start a new series of Orthovisc injections for the right knee. \par \par 4/18/16: Pt presents for Orthovisc injection #2 for the right knee. He states that he has felt some slight improvement since the first injection.\par \par 6/15/16: Pt presents for Orthovisc injection #3 for the right knee. \par \par 7/15/16: Pt presents for final Orthovisc injections #4 for the right knee. \par \par 7/20/16: PT present for f/u back and left shoulder. \par He reports a recent exacerbation of lower back pain\par He reports left shoulder pain which is present when reaching upwards and causing nocturnal awakening - *SA injection left shoulder. \par \par He reports that right knee Orthovisc injections provided good relief. \par He will return next week to start a series of orthovisc injections for the left knee\par \par 9/20/16: Pt presents for f/u. He reports that he obtained excellent relief from a series of Orthovisc injections in the past and has requested to start a new series today. Pt presents to start a new series of Orthovisc injections for the left knee. \par \par _____\par \par 7/2/18: Mr. Ugo Diallo, a 68-year-old male, presents today for b/l knees. \par He reports that he has been experiencing sharp pain over the posterior aspect of the left knee. He reports that he has experiencing zhanna giving way of the left knee. \par He reports that he obtained significant relief from orthovisc injections two years ago and is interested in starting a new series. \par *New series of Orthovisc injections for b/l knees started today.\par \par 7/19/18: Pt presents for Orthovisc injections #2 for b/l knees.\par \par 8/2/18: Pt presents for Orthovisc injections #3 for b/l knees.\par \par 8/8/18: Pt presents for final Orthovisc injections #4 for b/l knees.\par \par 4/29/19: Pt presents for f/u b/l knees. He reports that he obtained significant relief after completing the series of Orthovisc injections in August. He reports that recently he has been seeing a return and worsening of his b/l knee pain. He states that his pain is most present with climbing stairs and extended walking. \par *New series Orthovisc injections for b/l knees started today.\par \par 7/1/19: Pt presents for Orthovisc injections #2 for b/l knees.\par \par 8/7/19: Pt presents for Orthovisc injections #3 for b/l knees.\par \par 8/29/19: Pt presents for Orthovisc injections #4 for b/l knees.\par \par 6/14/2021: PT presents today for F/UP B/L knee.\par Pt reports that B/L knee Orthovisc injections provided good relief from previous visit.\par **New series Orthovisc injections for b/l knees started today**.\par \par 6/21/2021: Pt presents for Orthovisc injections #2 for b/l knees.\par \par 6/28/2021:Pt presents for Orthovisc injections #3 for b/l knees.\par \par 7/6/2021:Pt presents for Orthovisc injections #4 for b/l knees \par  \par 06/10/2022: B/L knee\par Pt is interested in starting a new series. Pt reports longer than 6 months following the last Orthovisc series. \par *****Orthovisc B/L knee inj #1*******\par \par 06/24/2022: B/L knee\par ******Orthovisc B/L inj #2*************\par \par 07/08/2022: B/L knee\par ******Orthovisc B/L inj #3*************\par \par 07/29/2022: B/L knee\par ******Orthovisc B/L inj #3*************\par \par 06/01/2023: B/L Knees\par Pt reports experiencing a stabbing pain in the posterior of SAVITA knees, especially when pivoting. He also struggles to rise from a sitting position due to the pain. He also notes sharp pain when changing position in bed.\par *****Orthovisc B/L knee inj #1*******\par \par 6/12/23: Pt presents for Orthovisc #2 for b/l knees. \par \par 06/15/2023: Orthovisc injections #3 for b/l knees. \par

## 2023-06-15 NOTE — PROCEDURE
[FreeTextEntry3] : Large joint injection was performed of the SAVITA knees. The indication for this procedure was pain, inflammation and x-ray evidence of Osteoarthritis on this or prior visit. \par The site was prepped with alcohol, betadine, ethyl chloride sprayed topically and sterile technique used. \par \par An injection of Orthovisc (30mg), series # 3 was used. \par \par Patient tolerated procedure well. Patient was advised to call if redness, pain or fever occur and apply ice for 15 minutes out of every hour for the next 12-24 hours as tolerated. \par \par Patient has tried OTC's including aspirin, Ibuprofen, Aleve, etc or prescription NSAIDS, and/or exercises at home and/or physical therapy without satisfactory response, patient had decreased mobility in the joint and the risks benefits, and alternatives have been discussed, and verbal consent was obtained.

## 2023-06-23 ENCOUNTER — APPOINTMENT (OUTPATIENT)
Dept: ORTHOPEDIC SURGERY | Facility: CLINIC | Age: 73
End: 2023-06-23
Payer: MEDICARE

## 2023-06-23 DIAGNOSIS — M21.061 VALGUS DEFORMITY, NOT ELSEWHERE CLASSIFIED, RIGHT KNEE: ICD-10-CM

## 2023-06-23 DIAGNOSIS — M17.0 BILATERAL PRIMARY OSTEOARTHRITIS OF KNEE: ICD-10-CM

## 2023-06-23 DIAGNOSIS — M21.062 VALGUS DEFORMITY, NOT ELSEWHERE CLASSIFIED, LEFT KNEE: ICD-10-CM

## 2023-06-23 PROCEDURE — 20610 DRAIN/INJ JOINT/BURSA W/O US: CPT | Mod: LT

## 2023-06-23 PROCEDURE — 99213 OFFICE O/P EST LOW 20 MIN: CPT | Mod: 25

## 2023-06-23 NOTE — PROCEDURE
[FreeTextEntry3] : Large joint injection was performed of the SAVITA knees. The indication for this procedure was pain, inflammation and x-ray evidence of Osteoarthritis on this or prior visit. \par The site was prepped with alcohol, betadine, ethyl chloride sprayed topically and sterile technique used. \par \par An injection of Orthovisc (30mg), series # 4 was used. \par \par Patient tolerated procedure well. Patient was advised to call if redness, pain or fever occur and apply ice for 15 minutes out of every hour for the next 12-24 hours as tolerated. \par \par Patient has tried OTC's including aspirin, Ibuprofen, Aleve, etc or prescription NSAIDS, and/or exercises at home and/or physical therapy without satisfactory response, patient had decreased mobility in the joint and the risks benefits, and alternatives have been discussed, and verbal consent was obtained.

## 2023-06-23 NOTE — HISTORY OF PRESENT ILLNESS
[de-identified] :  \par Reason for Visit 6. Injection \par Patient Complaint - 65 y/o male c/o left knee pain for past few months without injury. Pain with arising out of a chair. Can tolerate level walking. No buckling or locking.\par 5/20/15: Pt is here for L knee f/u. He is s/p Orthovisc series in November 2014. He will begin repeat series today with #1 L knee.\par 5/28/15: He is here for the second left knee Orthovisc injection. He reports no problems after the first injection.\par 11/4/15: Pt is here for Orthovisc injection #3 for left knee.\par 12/7/15: Pt is here for orthovisc injection # 4 left knee. He reports improvement in pain.\par \par 3/22/16: NI: right knee. he reports worsening right knee pain, he has obtained excellent relief in the past from orthovisc injections for the right knee and a request to start a new series of injections for the RIGHT knee will be made today.\par \par 4/6/16: Pt presents today to start a new series of Orthovisc injections for the right knee. \par \par 4/18/16: Pt presents for Orthovisc injection #2 for the right knee. He states that he has felt some slight improvement since the first injection.\par \par 6/15/16: Pt presents for Orthovisc injection #3 for the right knee. \par \par 7/15/16: Pt presents for final Orthovisc injections #4 for the right knee. \par \par 7/20/16: PT present for f/u back and left shoulder. \par He reports a recent exacerbation of lower back pain\par He reports left shoulder pain which is present when reaching upwards and causing nocturnal awakening - *SA injection left shoulder. \par \par He reports that right knee Orthovisc injections provided good relief. \par He will return next week to start a series of orthovisc injections for the left knee\par \par 9/20/16: Pt presents for f/u. He reports that he obtained excellent relief from a series of Orthovisc injections in the past and has requested to start a new series today. Pt presents to start a new series of Orthovisc injections for the left knee. \par \par _____\par \par 7/2/18: Mr. Ugo Diallo, a 68-year-old male, presents today for b/l knees. \par He reports that he has been experiencing sharp pain over the posterior aspect of the left knee. He reports that he has experiencing zhanna giving way of the left knee. \par He reports that he obtained significant relief from orthovisc injections two years ago and is interested in starting a new series. \par *New series of Orthovisc injections for b/l knees started today.\par \par 7/19/18: Pt presents for Orthovisc injections #2 for b/l knees.\par \par 8/2/18: Pt presents for Orthovisc injections #3 for b/l knees.\par \par 8/8/18: Pt presents for final Orthovisc injections #4 for b/l knees.\par \par 4/29/19: Pt presents for f/u b/l knees. He reports that he obtained significant relief after completing the series of Orthovisc injections in August. He reports that recently he has been seeing a return and worsening of his b/l knee pain. He states that his pain is most present with climbing stairs and extended walking. \par *New series Orthovisc injections for b/l knees started today.\par \par 7/1/19: Pt presents for Orthovisc injections #2 for b/l knees.\par \par 8/7/19: Pt presents for Orthovisc injections #3 for b/l knees.\par \par 8/29/19: Pt presents for Orthovisc injections #4 for b/l knees.\par \par 6/14/2021: PT presents today for F/UP B/L knee.\par Pt reports that B/L knee Orthovisc injections provided good relief from previous visit.\par **New series Orthovisc injections for b/l knees started today**.\par \par 6/21/2021: Pt presents for Orthovisc injections #2 for b/l knees.\par \par 6/28/2021:Pt presents for Orthovisc injections #3 for b/l knees.\par \par 7/6/2021:Pt presents for Orthovisc injections #4 for b/l knees \par  \par 06/10/2022: B/L knee\par Pt is interested in starting a new series. Pt reports longer than 6 months following the last Orthovisc series. \par *****Orthovisc B/L knee inj #1*******\par \par 06/24/2022: B/L knee\par ******Orthovisc B/L inj #2*************\par \par 07/08/2022: B/L knee\par ******Orthovisc B/L inj #3*************\par \par 07/29/2022: B/L knee\par ******Orthovisc B/L inj #3*************\par \par 06/01/2023: B/L Knees\par Pt reports experiencing a stabbing pain in the posterior of SAVITA knees, especially when pivoting. He also struggles to rise from a sitting position due to the pain. He also notes sharp pain when changing position in bed.\par *****Orthovisc B/L knee inj #1*******\par \par 6/12/23: Pt presents for Orthovisc #2 for b/l knees. \par \par 06/15/2023: Orthovisc injections #3 for b/l knees. \par \par 06/23/2023: Orthovisc injections #4 for b/l knees.\par

## 2023-06-23 NOTE — DISCUSSION/SUMMARY
[de-identified] : 1) I discussed the risks, benefits and alternatives of treatment options for the SAVITA knees, including activity modification, rest, home exercise, oral antiinflammatory medication, CSI, viscosupplementation.\par 2) The patient may take OTC NSAIDs PRN for pain. \par 3) Pt will continue with activity modification and home exercise as tolerated.  The patient should avoid exercise and activity that aggravates pain. \par \par Orthovisc injections #4 performed to SAVITA knees. \par \par \par The patient will continue with conservative treatment as described above, and will F/U in 6 months.\par \par \par The patient was advised of the diagnosis.  The natural history of the pathology was explained in full to the patient in layman's terms, including but not limited to the risks, symptoms and available options for treatment.  We discussed the risks, benefits and alternatives of the treatment options and the advice I provided to the patient as listed above.  Pt was given the opportunity to ask questions, and all questions were answered.  The discussion was not limited to the above.\par \par Entered by Angela Inman acting as scribe.

## 2023-06-23 NOTE — IMAGING
[de-identified] : Right Knee Exam: Minimal effusion. ROM is from full extension to at least 120 degrees. There is a moderate valgus which is fully correctable. Anterior drawer test is negative. He is able to walk with an antalgic limp. There is no joint line tenderness. \par \par Left Knee Exam: There is mild valgus which is fully correctable. Otherwise ligamental stable. Anterior drawer test is negative. There is a 2 degree flexion contracture to 120 degrees. The extension endpoint is painful. With force applied against the extension limit there is anterior pain. Weightbearing Najma's produces posterolateral pain. There is a small effusion. No joint line tenderness.  \par \par Right Knee X-Rays: Advanced articular cartilage loss of the lateral compartment with reactive spurs and valgus angulation. \par \par Left Knee X-Rays: Advanced articular cartilage obliteration of the lateral compartment without reactive spurs. Valgus angulation.

## 2025-06-13 NOTE — IMAGING
[de-identified] : Right Knee Exam: Minimal effusion. ROM is from full extension to at least 120 degrees. There is a moderate valgus which is fully correctable. Anterior drawer test is negative. He is able to walk with an antalgic limp. There is no joint line tenderness. \par \par Left Knee Exam: There is mild valgus which is fully correctable. Otherwise ligamental stable. Anterior drawer test is negative. There is a 2 degree flexion contracture to 120 degrees. The extension endpoint is painful. With force applied against the extension limit there is anterior pain. Weightbearing Najma's produces posterolateral pain. There is a small effusion. No joint line tenderness.  \par \par Right Knee X-Rays: Advanced articular cartilage loss of the lateral compartment with reactive spurs and valgus angulation. \par \par Left Knee X-Rays: Advanced articular cartilage obliteration of the lateral compartment without reactive spurs. Valgus angulation. 
[de-identified] : Right Knee Exam: Minimal effusion. ROM is from full extension to at least 120 degrees. There is a moderate valgus which is fully correctable. Anterior drawer test is negative. He is able to walk with an antalgic limp. There is no joint line tenderness. \par \par Left Knee Exam: There is mild valgus which is fully correctable. Otherwise ligamental stable. Anterior drawer test is negative. There is a 2 degree flexion contracture to 120 degrees. The extension endpoint is painful. With force applied against the extension limit there is anterior pain. Weightbearing Najma's produces posterolateral pain. There is a small effusion. No joint line tenderness.  \par \par Right Knee X-Rays: Advanced articular cartilage loss of the lateral compartment with reactive spurs and valgus angulation. \par \par Left Knee X-Rays: Advanced articular cartilage obliteration of the lateral compartment without reactive spurs. Valgus angulation. 
Bed in lowest position, wheels locked, appropriate side rails in place/Call bell, personal items and telephone in reach/Instruct patient to call for assistance before getting out of bed or chair/Non-slip footwear when patient is out of bed/[place] to call system/Physically safe environment - no spills, clutter or unnecessary equipment/Purposeful Proactive Rounding/Room/bathroom lighting operational, light cord in reach